# Patient Record
Sex: MALE | Race: WHITE | Employment: FULL TIME | URBAN - METROPOLITAN AREA
[De-identification: names, ages, dates, MRNs, and addresses within clinical notes are randomized per-mention and may not be internally consistent; named-entity substitution may affect disease eponyms.]

---

## 2020-11-20 ENCOUNTER — APPOINTMENT (OUTPATIENT)
Dept: GENERAL RADIOLOGY | Age: 20
End: 2020-11-20
Attending: EMERGENCY MEDICINE
Payer: MEDICAID

## 2020-11-20 ENCOUNTER — HOSPITAL ENCOUNTER (EMERGENCY)
Age: 20
Discharge: HOME | End: 2020-11-20
Attending: EMERGENCY MEDICINE
Payer: MEDICAID

## 2020-11-20 VITALS
HEIGHT: 69 IN | RESPIRATION RATE: 17 BRPM | TEMPERATURE: 97.8 F | WEIGHT: 157 LBS | SYSTOLIC BLOOD PRESSURE: 139 MMHG | BODY MASS INDEX: 23.25 KG/M2 | OXYGEN SATURATION: 100 % | DIASTOLIC BLOOD PRESSURE: 89 MMHG | HEART RATE: 99 BPM

## 2020-11-20 DIAGNOSIS — G47.30 SLEEP APNEA, UNSPECIFIED TYPE: Primary | ICD-10-CM

## 2020-11-20 LAB
ALBUMIN SERPL-MCNC: 4.3 G/DL (ref 3.4–5)
ALBUMIN/GLOB SERPL: 1.3 {RATIO} (ref 0.8–1.7)
ALP SERPL-CCNC: 122 U/L (ref 45–117)
ALT SERPL-CCNC: 25 U/L (ref 16–61)
ANION GAP SERPL CALC-SCNC: 5 MMOL/L (ref 3–18)
AST SERPL-CCNC: 16 U/L (ref 10–38)
ATRIAL RATE: 93 BPM
BASOPHILS # BLD: 0.1 K/UL (ref 0–0.1)
BASOPHILS NFR BLD: 1 % (ref 0–2)
BILIRUB SERPL-MCNC: 0.7 MG/DL (ref 0.2–1)
BUN SERPL-MCNC: 11 MG/DL (ref 7–18)
BUN/CREAT SERPL: 12 (ref 12–20)
CALCIUM SERPL-MCNC: 9.7 MG/DL (ref 8.5–10.1)
CALCULATED P AXIS, ECG09: 74 DEGREES
CALCULATED R AXIS, ECG10: 91 DEGREES
CALCULATED T AXIS, ECG11: 70 DEGREES
CHLORIDE SERPL-SCNC: 107 MMOL/L (ref 100–111)
CK MB CFR SERPL CALC: 1.7 % (ref 0–4)
CK MB SERPL-MCNC: 1.7 NG/ML (ref 5–25)
CK SERPL-CCNC: 103 U/L (ref 39–308)
CO2 SERPL-SCNC: 30 MMOL/L (ref 21–32)
CREAT SERPL-MCNC: 0.92 MG/DL (ref 0.6–1.3)
DIAGNOSIS, 93000: NORMAL
DIFFERENTIAL METHOD BLD: ABNORMAL
EOSINOPHIL # BLD: 0.2 K/UL (ref 0–0.4)
EOSINOPHIL NFR BLD: 3 % (ref 0–5)
ERYTHROCYTE [DISTWIDTH] IN BLOOD BY AUTOMATED COUNT: 12 % (ref 11.6–14.5)
GLOBULIN SER CALC-MCNC: 3.3 G/DL (ref 2–4)
GLUCOSE SERPL-MCNC: 78 MG/DL (ref 74–99)
HCT VFR BLD AUTO: 47.7 % (ref 36–48)
HGB BLD-MCNC: 17.3 G/DL (ref 13–16)
LYMPHOCYTES # BLD: 1.4 K/UL (ref 0.9–3.6)
LYMPHOCYTES NFR BLD: 24 % (ref 21–52)
MCH RBC QN AUTO: 32.5 PG (ref 24–34)
MCHC RBC AUTO-ENTMCNC: 36.3 G/DL (ref 31–37)
MCV RBC AUTO: 89.5 FL (ref 74–97)
MONOCYTES # BLD: 0.4 K/UL (ref 0.05–1.2)
MONOCYTES NFR BLD: 7 % (ref 3–10)
NEUTS SEG # BLD: 3.6 K/UL (ref 1.8–8)
NEUTS SEG NFR BLD: 65 % (ref 40–73)
P-R INTERVAL, ECG05: 132 MS
PLATELET # BLD AUTO: 260 K/UL (ref 135–420)
PMV BLD AUTO: 9.9 FL (ref 9.2–11.8)
POTASSIUM SERPL-SCNC: 3.8 MMOL/L (ref 3.5–5.5)
PROT SERPL-MCNC: 7.6 G/DL (ref 6.4–8.2)
Q-T INTERVAL, ECG07: 336 MS
QRS DURATION, ECG06: 96 MS
QTC CALCULATION (BEZET), ECG08: 417 MS
RBC # BLD AUTO: 5.33 M/UL (ref 4.7–5.5)
SODIUM SERPL-SCNC: 142 MMOL/L (ref 136–145)
TROPONIN I SERPL-MCNC: <0.02 NG/ML (ref 0–0.04)
VENTRICULAR RATE, ECG03: 93 BPM
WBC # BLD AUTO: 5.6 K/UL (ref 4.6–13.2)

## 2020-11-20 PROCEDURE — 85025 COMPLETE CBC W/AUTO DIFF WBC: CPT

## 2020-11-20 PROCEDURE — 99285 EMERGENCY DEPT VISIT HI MDM: CPT

## 2020-11-20 PROCEDURE — 80053 COMPREHEN METABOLIC PANEL: CPT

## 2020-11-20 PROCEDURE — 82550 ASSAY OF CK (CPK): CPT

## 2020-11-20 PROCEDURE — 93005 ELECTROCARDIOGRAM TRACING: CPT

## 2020-11-20 RX ORDER — SERTRALINE HYDROCHLORIDE 100 MG/1
100 TABLET, FILM COATED ORAL DAILY
COMMUNITY
Start: 2020-10-19

## 2020-11-20 RX ORDER — LISDEXAMFETAMINE DIMESYLATE 70 MG/1
70 CAPSULE ORAL DAILY
COMMUNITY
Start: 2020-10-31

## 2020-11-20 RX ORDER — ARIPIPRAZOLE 15 MG/1
15 TABLET ORAL
COMMUNITY
Start: 2020-10-28

## 2020-11-20 NOTE — ED PROVIDER NOTES
EMERGENCY DEPARTMENT HISTORY AND PHYSICAL EXAM    Date: 11/20/2020  Patient Name: Vicente Munguia    History of Presenting Illness     Chief Complaint   Patient presents with    Chest Pain         History Provided By: Patient      Vicente Munguia is a 23 y.o. male with PMHX of Asperger's, ADHD who presents to the emergency department C/O breathing issues. The patient reports that for the past 3 nights while he is falling asleep he suddenly wakes up gasping for air. He tells me he has some left sided chest pain when this occurs. He says he becomes very aware of his breathing and is afraid that he is going to stop breathing when he sleeps. Patient has no complaints at rest or throughout the day. He tells me that he was seen last week at a urgent care for nausea and diarrhea that is since resolved. Patient says he has been told he snores. He lives with his grandmother who is also here and thinks this is secondary to his anxiety. PCP: Marcus Armas MD    Current Outpatient Medications   Medication Sig Dispense Refill    ARIPiprazole (ABILIFY) 15 mg tablet Take 15 mg by mouth nightly.  Vyvanse 70 mg cap Take 70 mg by mouth daily.  sertraline (ZOLOFT) 100 mg tablet Take 100 mg by mouth daily. Past History     Past Medical History:  Past Medical History:   Diagnosis Date    ADHD     Asperger's disorder        Past Surgical History:  History reviewed. No pertinent surgical history. Family History:  History reviewed. No pertinent family history. Social History:  Social History     Tobacco Use    Smoking status: Never Smoker    Smokeless tobacco: Never Used   Substance Use Topics    Alcohol use: Yes     Comment: 1 glass of beer every other week    Drug use: Never       Allergies: Allergies   Allergen Reactions    Bactrim [Sulfamethoxazole-Trimethoprim] Rash         Review of Systems   Review of Systems   Constitutional: Negative for chills and fever.    Respiratory: Positive for apnea. Cardiovascular: Positive for chest pain. Gastrointestinal: Negative for diarrhea, nausea and vomiting. All other systems reviewed and are negative. Physical Exam     Vitals:    11/20/20 0727 11/20/20 0800 11/20/20 0830   BP: 122/61 138/80 139/89   Pulse: 93 84 99   Resp: 17 16 17   Temp: 97.8 °F (36.6 °C)     SpO2: 100%     Weight: 71.2 kg (157 lb)     Height: 5' 9\" (1.753 m)       Physical Exam  Vitals signs and nursing note reviewed. Constitutional:       General: He is not in acute distress. Appearance: Normal appearance. He is well-developed. He is not ill-appearing. HENT:      Head: Normocephalic and atraumatic. Eyes:      Extraocular Movements: Extraocular movements intact. Conjunctiva/sclera: Conjunctivae normal.      Pupils: Pupils are equal, round, and reactive to light. Neck:      Musculoskeletal: Normal range of motion and neck supple. Cardiovascular:      Rate and Rhythm: Normal rate and regular rhythm. Pulses: Normal pulses. Heart sounds: Normal heart sounds. Pulmonary:      Effort: Pulmonary effort is normal. No respiratory distress. Breath sounds: Normal breath sounds. No wheezing or rales. Chest:      Chest wall: No tenderness. Abdominal:      General: There is no distension. Palpations: Abdomen is soft. Tenderness: There is no abdominal tenderness. There is no guarding or rebound. Musculoskeletal: Normal range of motion. General: No tenderness. Lymphadenopathy:      Cervical: No cervical adenopathy. Skin:     General: Skin is warm and dry. Neurological:      General: No focal deficit present. Mental Status: He is alert and oriented to person, place, and time. Cranial Nerves: No cranial nerve deficit. Motor: No abnormal muscle tone.       Coordination: Coordination normal.   Psychiatric:         Mood and Affect: Mood normal.         Behavior: Behavior normal.             Diagnostic Study Results Labs -     Recent Results (from the past 12 hour(s))   EKG, 12 LEAD, INITIAL    Collection Time: 11/20/20  7:23 AM   Result Value Ref Range    Ventricular Rate 93 BPM    Atrial Rate 93 BPM    P-R Interval 132 ms    QRS Duration 96 ms    Q-T Interval 336 ms    QTC Calculation (Bezet) 417 ms    Calculated P Axis 74 degrees    Calculated R Axis 91 degrees    Calculated T Axis 70 degrees    Diagnosis       Sinus rhythm with marked sinus arrhythmia  Rightward axis  Borderline ECG  When compared with ECG of 24-APR-2009 07:53,  PREVIOUS ECG IS PRESENT     CBC WITH AUTOMATED DIFF    Collection Time: 11/20/20  7:30 AM   Result Value Ref Range    WBC 5.6 4.6 - 13.2 K/uL    RBC 5.33 4.70 - 5.50 M/uL    HGB 17.3 (H) 13.0 - 16.0 g/dL    HCT 47.7 36.0 - 48.0 %    MCV 89.5 74.0 - 97.0 FL    MCH 32.5 24.0 - 34.0 PG    MCHC 36.3 31.0 - 37.0 g/dL    RDW 12.0 11.6 - 14.5 %    PLATELET 734 817 - 312 K/uL    MPV 9.9 9.2 - 11.8 FL    NEUTROPHILS 65 40 - 73 %    LYMPHOCYTES 24 21 - 52 %    MONOCYTES 7 3 - 10 %    EOSINOPHILS 3 0 - 5 %    BASOPHILS 1 0 - 2 %    ABS. NEUTROPHILS 3.6 1.8 - 8.0 K/UL    ABS. LYMPHOCYTES 1.4 0.9 - 3.6 K/UL    ABS. MONOCYTES 0.4 0.05 - 1.2 K/UL    ABS. EOSINOPHILS 0.2 0.0 - 0.4 K/UL    ABS. BASOPHILS 0.1 0.0 - 0.1 K/UL    DF AUTOMATED     METABOLIC PANEL, COMPREHENSIVE    Collection Time: 11/20/20  7:30 AM   Result Value Ref Range    Sodium 142 136 - 145 mmol/L    Potassium 3.8 3.5 - 5.5 mmol/L    Chloride 107 100 - 111 mmol/L    CO2 30 21 - 32 mmol/L    Anion gap 5 3.0 - 18 mmol/L    Glucose 78 74 - 99 mg/dL    BUN 11 7.0 - 18 MG/DL    Creatinine 0.92 0.6 - 1.3 MG/DL    BUN/Creatinine ratio 12 12 - 20      GFR est AA >60 >60 ml/min/1.73m2    GFR est non-AA >60 >60 ml/min/1.73m2    Calcium 9.7 8.5 - 10.1 MG/DL    Bilirubin, total 0.7 0.2 - 1.0 MG/DL    ALT (SGPT) 25 16 - 61 U/L    AST (SGOT) 16 10 - 38 U/L    Alk.  phosphatase 122 (H) 45 - 117 U/L    Protein, total 7.6 6.4 - 8.2 g/dL    Albumin 4.3 3.4 - 5.0 g/dL    Globulin 3.3 2.0 - 4.0 g/dL    A-G Ratio 1.3 0.8 - 1.7     CARDIAC PANEL,(CK, CKMB & TROPONIN)    Collection Time: 11/20/20  7:30 AM   Result Value Ref Range    CK - MB 1.7 <3.6 ng/ml    CK-MB Index 1.7 0.0 - 4.0 %     39 - 308 U/L    Troponin-I, QT <0.02 0.0 - 0.045 NG/ML       Radiologic Studies -   XR CHEST PORT   Final Result   Impression:      No plain film evidence of acute cardiopulmonary disease, allowing for technique. CT Results  (Last 48 hours)    None        CXR Results  (Last 48 hours)               11/20/20 0745  XR CHEST PORT Final result    Impression:  Impression:       No plain film evidence of acute cardiopulmonary disease, allowing for technique. Narrative:  HISTORY:    -Provided on order: cp sob   -Additional: None       Technique : AP PORTABLE CHEST       Comparison : None. FINDINGS:       HEART AND MEDIASTINUM: Unremarkable. LUNGS AND PLEURAL SPACES: No consolidation, congestive heart failure, pleural   effusion or pneumothorax. BONY THORAX AND SOFT TISSUES: No acute osseous abnormality. Medications given in the ED-  Medications - No data to display      Medical Decision Making   I am the first provider for this patient. I reviewed the vital signs, available nursing notes, past medical history, past surgical history, family history and social history. Vital Signs-Reviewed the patient's vital signs.     Pulse Oximetry Analysis and Interpretation:   100% on ra, nml        EKG interpretation: (Preliminary)  Interpreted by Marjorie Bains MD at 2315  Normal sinus rhythm with sinus arrhythmia, normal intervals, somewhat rightward axis, no ND depression, no ST elevation or depression, otherwise normal EKG    CXR  interpretation: (Preliminary)  CXR read by Dr. Marjorie Bains    No acute finding chest, normal cardia silhouette      Records Reviewed: Nursing Notes    Provider Notes (Medical Decision Making): Axel Phillips is a 23 y.o. male here with episodes of sleep apnea when transitioning to sleep. Patient history is consistent with sleep apnea. Exam is benign. Blood work including cardiac enzymes unremarkable chest x-ray normal.  EKG is unremarkable and otherwise young for 18-year-old male. Discussed with him his symptoms and why I think there being cause. I have instructed him to follow-up with his primary care doctor and to discuss whether or not he should undergo sleep study. Procedures:  Procedures    ED Course:       Diagnosis and Disposition     Critical Care:     DISCHARGE NOTE:    You Pencil  results have been reviewed with him. He has been counseled regarding his diagnosis, treatment, and plan. He verbally conveys understanding and agreement of the signs, symptoms, diagnosis, treatment and prognosis and additionally agrees to follow up as discussed. He also agrees with the care-plan and conveys that all of his questions have been answered. I have also provided discharge instructions for him that include: educational information regarding their diagnosis and treatment, and list of reasons why they would want to return to the ED prior to their follow-up appointment, should his condition change. He has been provided with education for proper emergency department utilization. CLINICAL IMPRESSION:    1. Sleep apnea, unspecified type        PLAN:  1. D/C Home  2. Current Discharge Medication List        3. Follow-up Information     Follow up With Specialties Details Why Contact Info    Your PCP  Schedule an appointment as soon as possible for a visit in 1 week For primary care follow up         _______________________________      Please note that this dictation was completed with [employer], the computer voice recognition software. Quite often unanticipated grammatical, syntax, homophones, and other interpretive errors are inadvertently transcribed by the computer software. Please disregard these errors. Please excuse any errors that have escaped final proofreading.

## 2020-11-20 NOTE — LETTER
[facility] [place] 
[facility] EMERGENCY DEPT 
[address] [id number] [phone] Work/School Note Date: 11/20/2020 To Whom It May concern: 
 
 
Gregorio Anderson was seen and treated today in the emergency room by the following provider(s): 
Attending Provider: Kiley Rincon MD. Gregorio Anderson is excused from work/school on 11/20/20. He is clear to return to work/school on 11/21/20.    
 
 
Sincerely, 
 
 
 
 
Abena Bradley MD

## 2020-11-20 NOTE — ED TRIAGE NOTES
Pt w/ /co LEFT upper chest pain and SOB since Tuesday. Pt reports he notices this pain when he goes to bed and lies down and states \"I feel like I forget to breathe. \" Pt also adds diarrhea last week, denies and fever or cough.

## 2020-11-20 NOTE — DISCHARGE INSTRUCTIONS
Please follow-up with your family doctor.   You should discuss having a sleep study done if your symptoms persist.